# Patient Record
Sex: FEMALE | Race: WHITE | NOT HISPANIC OR LATINO | URBAN - NONMETROPOLITAN AREA
[De-identification: names, ages, dates, MRNs, and addresses within clinical notes are randomized per-mention and may not be internally consistent; named-entity substitution may affect disease eponyms.]

---

## 2017-08-29 ENCOUNTER — OFFICE VISIT (OUTPATIENT)
Dept: RETAIL CLINIC | Facility: CLINIC | Age: 77
End: 2017-08-29

## 2017-08-29 DIAGNOSIS — Z11.1 VISIT FOR TB SKIN TEST: Primary | ICD-10-CM

## 2017-08-29 PROCEDURE — 86580 TB INTRADERMAL TEST: CPT | Performed by: NURSE PRACTITIONER

## 2017-08-29 NOTE — PROGRESS NOTES
Patient presents for TB skin test.  Advised to return between 48-72 hours to have test read.  See scanned documents

## 2017-10-19 ENCOUNTER — OFFICE VISIT (OUTPATIENT)
Dept: OTOLARYNGOLOGY | Facility: CLINIC | Age: 77
End: 2017-10-19

## 2017-10-19 VITALS
TEMPERATURE: 98.4 F | BODY MASS INDEX: 24.04 KG/M2 | RESPIRATION RATE: 18 BRPM | HEIGHT: 66 IN | OXYGEN SATURATION: 98 % | WEIGHT: 149.6 LBS | HEART RATE: 88 BPM | DIASTOLIC BLOOD PRESSURE: 91 MMHG | SYSTOLIC BLOOD PRESSURE: 158 MMHG

## 2017-10-19 DIAGNOSIS — J34.3 HYPERTROPHY OF NASAL TURBINATES: ICD-10-CM

## 2017-10-19 DIAGNOSIS — J30.9 ALLERGIC RHINITIS, UNSPECIFIED CHRONICITY, UNSPECIFIED SEASONALITY, UNSPECIFIED TRIGGER: ICD-10-CM

## 2017-10-19 DIAGNOSIS — J34.89 NASAL VESTIBULITIS: ICD-10-CM

## 2017-10-19 DIAGNOSIS — J32.9 CHRONIC SINUSITIS, UNSPECIFIED LOCATION: Primary | ICD-10-CM

## 2017-10-19 PROCEDURE — 99203 OFFICE O/P NEW LOW 30 MIN: CPT | Performed by: NURSE PRACTITIONER

## 2017-10-19 PROCEDURE — 31231 NASAL ENDOSCOPY DX: CPT | Performed by: NURSE PRACTITIONER

## 2017-10-19 RX ORDER — MELOXICAM 15 MG/1
TABLET ORAL
COMMUNITY
Start: 2017-09-26 | End: 2017-10-19

## 2017-10-19 RX ORDER — DOXYCYCLINE HYCLATE 100 MG/1
CAPSULE ORAL
COMMUNITY
Start: 2017-09-05 | End: 2017-10-19

## 2017-10-19 RX ORDER — PREDNISONE 10 MG/1
TABLET ORAL
COMMUNITY
Start: 2017-09-26 | End: 2017-10-19

## 2017-10-19 RX ORDER — ESOMEPRAZOLE MAGNESIUM 40 MG/1
40 CAPSULE, DELAYED RELEASE ORAL 2 TIMES DAILY
COMMUNITY

## 2017-10-19 RX ORDER — METOPROLOL SUCCINATE 50 MG/1
50 TABLET, EXTENDED RELEASE ORAL DAILY
COMMUNITY

## 2017-10-19 RX ORDER — LOSARTAN POTASSIUM AND HYDROCHLOROTHIAZIDE 25; 100 MG/1; MG/1
1 TABLET ORAL DAILY
COMMUNITY

## 2017-10-19 RX ORDER — FLUTICASONE PROPIONATE 50 MCG
2 SPRAY, SUSPENSION (ML) NASAL DAILY
Qty: 16 G | Refills: 5 | Status: SHIPPED | OUTPATIENT
Start: 2017-10-19

## 2017-10-19 NOTE — PROGRESS NOTES
PROCEDURE NOTE      PROCEDURE:    Nasal Endoscopy  ANESTHESIA:  Topical Ponticaine and Afrin Spray   REASON FOR THE PROCEDURE:  Patient is here for an evaluation for nasal congestion, chronic sinus problems.  The patient consented to operative intervention after a full discussion of risks, benefits, and alternatives. No guarantees were made or implied.   DETAILS OF THE OPERATION:  The patient was seated in the exam room chair. Nasal endoscopy was performed with the 0 degree scope through the nares after applying nebulized ponticaine/ Afrin spray . A 0 degree endoscope was introduced into the nasal cavity and gently directed to the deeper nasal cavity examining the following structures.   FINDINGS:  Mucosal Surfaces:  The mucosal surfaces demonstrated edema and erythema  Nasal Septum:  The nasal septum was found to have normal mucosa  Inferior Turbinates:  The inferior turbinates were found to have hypertrophy  Middle Turbinates:  The middle turbinates were found severe hypertrophy right side  Middle Meatus:  The middle meatus were found to have normal mucosa.   Sphenoethmoid Recess:  The sphenoethmoidal recess was found to have normal mucosa.   Nasopharynx:  The nasopharynx was found to have no lesion or mass.

## 2017-10-19 NOTE — PROGRESS NOTES
YOB: 1940  Location: [facility]  Location Address: [address] [place] [address], [facility], [address] [place] [address]  Location Phone: [phone]    Chief Complaint   Patient presents with   • Sinus Problem     pain on the bridge of the nose mainly on right side /        History of Present Illness  Keshia Vazquez is a 77 y.o. female.  Keshia Vazquez is here for evaluation of ENT complaints. The patient has had problems with nasal congestion and sinus pressure  The symptoms are not localized to a particular location. The patient has had moderate symptoms. The symptoms have been present for the last year The symptoms are aggravated by  no identifiable factors. The symptoms are improved by no identifiable factors. Hx of dacrocystotomy in [place].  She reports a persistent bad smell in her nose, right sinus pressure, drainage.       Past Medical History:   Diagnosis Date   • Hypertension        Past Surgical History:   Procedure Laterality Date   • APPENDECTOMY     • CAROTID SUBCLAVIAN BYPASS     • CARPAL TUNNEL RELEASE Bilateral    • HEMORRHOIDECTOMY     • HYSTERECTOMY      total   • SHOULDER SURGERY Bilateral     replacement in right shoulder         Current Outpatient Prescriptions:   •  esomeprazole (nexIUM) 40 MG capsule, Take 40 mg by mouth 2 (Two) Times a Day., Disp: , Rfl:   •  losartan-hydrochlorothiazide (HYZAAR) 100-25 MG per tablet, Take 1 tablet by mouth Daily., Disp: , Rfl:   •  metoprolol succinate XL (TOPROL-XL) 50 MG 24 hr tablet, Take 50 mg by mouth Daily., Disp: , Rfl:   •  fluticasone (FLONASE) 50 MCG/ACT nasal spray, 2 sprays into each nostril Daily., Disp: 16 g, Rfl: 5  •  mupirocin (BACTROBAN) 2 % ointment, Apply intranasally bid, Disp: 22 g, Rfl: 0    Adhesive tape; Ciprofloxacin; Codeine; Penicillins; and Sulfa antibiotics    History reviewed. No pertinent family history.    Social History     Social History   • Marital status:      Spouse name: N/A   • Number of children:  N/A   • Years of education: N/A     Occupational History   • Not on file.     Social History Main Topics   • Smoking status: Former Smoker     Types: Cigarettes     Quit date: 1979   • Smokeless tobacco: Never Used   • Alcohol use No   • Drug use: No   • Sexual activity: Not on file     Other Topics Concern   • Not on file     Social History Narrative   • No narrative on file       Review of Systems   Constitutional: Negative.    HENT:        SEE HPI   Eyes: Negative.    Respiratory: Negative.    Cardiovascular: Negative.    Gastrointestinal: Negative.    Endocrine: Negative.    Genitourinary: Negative.    Musculoskeletal: Negative.    Skin: Negative.    Allergic/Immunologic: Negative.    Neurological: Negative.    Hematological: Negative.    Psychiatric/Behavioral: Negative.        Vitals:    10/19/17 1408   BP: 158/91   Pulse: 88   Resp: 18   Temp: 98.4 °F (36.9 °C)   SpO2: 98%       Objective     Physical Exam  CONSTITUTIONAL: well nourished, alert, oriented, in no acute distress     COMMUNICATION AND VOICE: able to communicate normally, normal voice quality    HEAD: normocephalic, no lesions, atraumatic, no tenderness, no masses     FACE: appearance normal, no lesions, no tenderness, no deformities, facial motion symmetric    SALIVARY GLANDS: parotid glands with no tenderness, no swelling, no masses, submandibular glands with normal size, nontender    EYES: ocular motility normal, eyelids normal, orbits normal, no proptosis, conjunctiva normal , pupils equal, round     EARS:  Hearing: response to conversational voice normal bilaterally   External Ears: auricles without lesions  Otoscopic: tympanic membrane appearance normal, no lesions, no perforation, normal mobility, no fluid    NOSE:  External Nose: structure normal, no tenderness on palpation, no nasal discharge, no lesions, no evidence of trauma, nostrils patent   Intranasal Exam: nasal mucosa edema, vestibule within normal limits, inferior turbinate  hypertrophy, nasal septum midline , right middle turbinate hypertrophy  ORAL:  Lips: upper and lower lips without lesion   Teeth: dentition within normal limits for age   Gums: gingivae healthy   Oral Mucosa: oral mucosa normal, no mucosal lesions   Floor of Mouth: Warthin’s duct patent, mucosa normal  Tongue: lingual mucosa normal without lesions, normal tongue mobility   Palate: soft and hard palates with normal mucosa and structure  Oropharynx: oropharyngeal mucosa normal    NECK: neck appearance normal, no mass,  noted without erythema or tenderness    THYROID: no overt thyromegaly, no tenderness, nodules or mass present on palpation, position midline     LYMPH NODES: no lymphadenopathy    CHEST/RESPIRATORY: respiratory effort normal,     CARDIOVASCULAR:  extremities without cyanosis or edema      NEUROLOGIC/PSYCHIATRIC: oriented to time, place and person, mood normal, affect appropriate, CN II-XII intact grossly    Assessment/Plan   Keshia Boyd was seen today for sinus problem.    Diagnoses and all orders for this visit:    Chronic sinusitis, unspecified location  -     CT Sinus Without Contrast; Future    Allergic rhinitis, unspecified chronicity, unspecified seasonality, unspecified trigger    Hypertrophy of nasal turbinates    Nasal vestibulitis    Other orders  -     mupirocin (BACTROBAN) 2 % ointment; Apply intranasally bid  -     fluticasone (FLONASE) 50 MCG/ACT nasal spray; 2 sprays into each nostril Daily.      * Surgery not found *  Orders Placed This Encounter   Procedures   • CT Sinus Without Contrast     Standing Status:   Future     Standing Expiration Date:   10/19/2018     Order Specific Question:   Reason for Exam:     Answer:   chronic sinusitis, foul odor in nose, sinus pressure     Return in about 8 weeks (around 12/14/2017).       Patient Instructions   Medical vs surgical options discussed    Call for problems or worsening symptoms

## 2017-11-13 DIAGNOSIS — J32.9 CHRONIC SINUSITIS, UNSPECIFIED LOCATION: ICD-10-CM
